# Patient Record
Sex: MALE | Race: WHITE | NOT HISPANIC OR LATINO | Employment: OTHER | URBAN - NONMETROPOLITAN AREA
[De-identification: names, ages, dates, MRNs, and addresses within clinical notes are randomized per-mention and may not be internally consistent; named-entity substitution may affect disease eponyms.]

---

## 2017-01-03 RX ORDER — PRAVASTATIN SODIUM 40 MG
TABLET ORAL
Qty: 30 TABLET | Refills: 1 | Status: SHIPPED | OUTPATIENT
Start: 2017-01-03 | End: 2018-05-01 | Stop reason: ALTCHOICE

## 2017-01-03 RX ORDER — GLYBURIDE 2.5 MG/1
TABLET ORAL
Qty: 60 TABLET | Refills: 1 | Status: SHIPPED | OUTPATIENT
Start: 2017-01-03 | End: 2017-04-12 | Stop reason: SDUPTHER

## 2017-04-03 PROBLEM — E78.5 HYPERLIPIDEMIA: Status: ACTIVE | Noted: 2017-04-03

## 2017-04-03 PROBLEM — I10 ESSENTIAL HYPERTENSION: Status: ACTIVE | Noted: 2017-04-03

## 2017-04-05 ENCOUNTER — TELEPHONE (OUTPATIENT)
Dept: FAMILY MEDICINE CLINIC | Facility: CLINIC | Age: 37
End: 2017-04-05

## 2017-04-05 NOTE — TELEPHONE ENCOUNTER
Patient Spouse rescheduled appt.  Requested refills but Patient has refills on all prescribed medications at [facility], Patient Spouse notified and she verbalized understanding.

## 2017-04-05 NOTE — TELEPHONE ENCOUNTER
----- Message from Alaina Zhang sent at 4/5/2017  1:01 PM CDT -----  Regarding: med refill  Patient needs refill on all meds, valdo [facility], [phone]

## 2017-04-12 ENCOUNTER — OFFICE VISIT (OUTPATIENT)
Dept: FAMILY MEDICINE CLINIC | Facility: CLINIC | Age: 37
End: 2017-04-12

## 2017-04-12 VITALS
WEIGHT: 228 LBS | OXYGEN SATURATION: 98 % | TEMPERATURE: 97.2 F | SYSTOLIC BLOOD PRESSURE: 132 MMHG | BODY MASS INDEX: 30.22 KG/M2 | DIASTOLIC BLOOD PRESSURE: 84 MMHG | HEIGHT: 73 IN | HEART RATE: 94 BPM

## 2017-04-12 DIAGNOSIS — J01.00 ACUTE NON-RECURRENT MAXILLARY SINUSITIS: Primary | ICD-10-CM

## 2017-04-12 PROCEDURE — 99212 OFFICE O/P EST SF 10 MIN: CPT | Performed by: NURSE PRACTITIONER

## 2017-04-12 RX ORDER — METHADONE HYDROCHLORIDE 40 MG/1
120 TABLET ORAL DAILY
COMMUNITY
End: 2018-05-01 | Stop reason: ALTCHOICE

## 2017-04-12 RX ORDER — PRAVASTATIN SODIUM 40 MG
TABLET ORAL
Qty: 30 TABLET | Refills: 1 | Status: SHIPPED | OUTPATIENT
Start: 2017-04-12 | End: 2017-04-12 | Stop reason: SDUPTHER

## 2017-04-12 RX ORDER — GABAPENTIN 300 MG/1
CAPSULE ORAL
Qty: 60 CAPSULE | Refills: 2 | Status: SHIPPED | OUTPATIENT
Start: 2017-04-12 | End: 2017-07-31 | Stop reason: SDUPTHER

## 2017-04-12 NOTE — PROGRESS NOTES
Subjective   Pancho Hobson is a 36 y.o. male who presents to the office complaining of sinus congestion and facial pain.  Has been taking Sine-Off the past four days.    History of Present Illness     The following portions of the patient's history were reviewed and updated as appropriate: allergies, current medications, past family history, past medical history, past social history, past surgical history and problem list.    Review of Systems   Constitutional: Negative for chills, fatigue and fever.   HENT: Positive for sinus pressure. Negative for congestion, sneezing, sore throat and trouble swallowing.    Eyes: Negative for visual disturbance.   Respiratory: Negative for cough, chest tightness, shortness of breath and wheezing.    Cardiovascular: Negative for chest pain, palpitations and leg swelling.   Gastrointestinal: Negative for abdominal pain, constipation, diarrhea, nausea and vomiting.   Genitourinary: Negative for dysuria, frequency and urgency.   Musculoskeletal: Negative for neck pain.   Skin: Negative for rash.   Neurological: Negative for dizziness, weakness and headaches.   Psychiatric/Behavioral:        In the past two weeks the pt has not felt down, depressed, hopeless or lost interest in doing things   All other systems reviewed and are negative.    Objective   Physical Exam   Constitutional: He is oriented to person, place, and time. He appears well-developed and well-nourished. He is cooperative.   HENT:   Head: Normocephalic and atraumatic.   Right Ear: External ear normal.   Left Ear: External ear normal.   Nose: Right sinus exhibits maxillary sinus tenderness. Left sinus exhibits maxillary sinus tenderness.   Mouth/Throat: Uvula is midline, oropharynx is clear and moist and mucous membranes are normal.   Eyes: Conjunctivae, EOM and lids are normal. Pupils are equal, round, and reactive to light. Right eye exhibits no discharge. Left eye exhibits no discharge. No scleral icterus.   Neck:  Trachea normal, normal range of motion and phonation normal. Neck supple. No thyromegaly present.   Cardiovascular: Normal rate, regular rhythm, normal heart sounds and intact distal pulses.  Exam reveals no gallop and no friction rub.    No murmur heard.  Pulmonary/Chest: Effort normal and breath sounds normal. No respiratory distress. He has no wheezes. He has no rales.   Abdominal: Soft. Normal appearance and bowel sounds are normal. He exhibits no distension. There is no tenderness. There is no rebound and no guarding.   Musculoskeletal: Normal range of motion. He exhibits no edema.   Lymphadenopathy:     He has no cervical adenopathy.   Neurological: He is alert and oriented to person, place, and time. No cranial nerve deficit. GCS eye subscore is 4. GCS verbal subscore is 5. GCS motor subscore is 6.   Skin: Skin is warm, dry and intact. No rash noted.   Psychiatric: He has a normal mood and affect. His speech is normal and behavior is normal. Judgment and thought content normal. Cognition and memory are normal.   Nursing note and vitals reviewed.    Assessment/Plan   Pancho was seen today for sinusitis.    Diagnoses and all orders for this visit:    Acute non-recurrent maxillary sinusitis

## 2017-04-19 PROBLEM — J01.00 ACUTE NON-RECURRENT MAXILLARY SINUSITIS: Status: ACTIVE | Noted: 2017-04-19

## 2017-06-12 RX ORDER — GLYBURIDE 2.5 MG/1
TABLET ORAL
Qty: 60 TABLET | Refills: 1 | Status: SHIPPED | OUTPATIENT
Start: 2017-06-12 | End: 2017-07-31 | Stop reason: SDUPTHER

## 2017-07-17 RX ORDER — PRAVASTATIN SODIUM 40 MG
TABLET ORAL
Qty: 30 TABLET | Refills: 1 | Status: SHIPPED | OUTPATIENT
Start: 2017-07-17 | End: 2018-05-01 | Stop reason: ALTCHOICE

## 2017-07-17 RX ORDER — GABAPENTIN 300 MG/1
CAPSULE ORAL
Qty: 60 CAPSULE | Refills: 2 | OUTPATIENT
Start: 2017-07-17

## 2017-07-31 RX ORDER — GLYBURIDE 2.5 MG/1
TABLET ORAL
Qty: 60 TABLET | Refills: 1 | Status: SHIPPED | OUTPATIENT
Start: 2017-07-31 | End: 2018-05-01 | Stop reason: ALTCHOICE

## 2017-07-31 RX ORDER — GABAPENTIN 300 MG/1
CAPSULE ORAL
Qty: 60 CAPSULE | Refills: 2 | Status: SHIPPED | OUTPATIENT
Start: 2017-07-31 | End: 2017-08-04 | Stop reason: SDUPTHER

## 2017-08-04 DIAGNOSIS — Z79.899 LONG TERM USE OF DRUG: Primary | ICD-10-CM

## 2017-08-04 RX ORDER — GLYBURIDE 2.5 MG/1
2.5 TABLET ORAL 2 TIMES DAILY WITH MEALS
Qty: 60 TABLET | Refills: 1 | Status: SHIPPED | OUTPATIENT
Start: 2017-08-04 | End: 2018-05-01 | Stop reason: ALTCHOICE

## 2017-08-04 RX ORDER — GABAPENTIN 300 MG/1
300 CAPSULE ORAL 2 TIMES DAILY
Qty: 60 CAPSULE | Refills: 0 | Status: SHIPPED | OUTPATIENT
Start: 2017-08-04 | End: 2018-05-01 | Stop reason: ALTCHOICE

## 2018-01-11 ENCOUNTER — HOSPITAL ENCOUNTER (EMERGENCY)
Facility: HOSPITAL | Age: 38
Discharge: HOME | End: 2018-01-11
Attending: EMERGENCY MEDICINE | Admitting: EMERGENCY MEDICINE

## 2018-01-11 VITALS
HEIGHT: 73 IN | HEART RATE: 115 BPM | WEIGHT: 200 LBS | TEMPERATURE: 98.3 F | DIASTOLIC BLOOD PRESSURE: 93 MMHG | RESPIRATION RATE: 22 BRPM | OXYGEN SATURATION: 98 % | BODY MASS INDEX: 26.51 KG/M2 | SYSTOLIC BLOOD PRESSURE: 139 MMHG

## 2018-01-11 DIAGNOSIS — M54.6 ACUTE MIDLINE THORACIC BACK PAIN: ICD-10-CM

## 2018-01-11 DIAGNOSIS — S29.019A ACUTE THORACIC MYOFASCIAL STRAIN, INITIAL ENCOUNTER: Primary | ICD-10-CM

## 2018-01-11 PROCEDURE — 96372 THER/PROPH/DIAG INJ SC/IM: CPT

## 2018-01-11 PROCEDURE — 25010000002 TRIAMCINOLONE PER 10 MG: Performed by: EMERGENCY MEDICINE

## 2018-01-11 PROCEDURE — 25010000002 PROMETHAZINE PER 50 MG: Performed by: EMERGENCY MEDICINE

## 2018-01-11 PROCEDURE — 99282 EMERGENCY DEPT VISIT SF MDM: CPT

## 2018-01-11 RX ORDER — PROMETHAZINE HYDROCHLORIDE 25 MG/ML
25 INJECTION, SOLUTION INTRAMUSCULAR; INTRAVENOUS ONCE
Status: COMPLETED | OUTPATIENT
Start: 2018-01-11 | End: 2018-01-11

## 2018-01-11 RX ORDER — IBUPROFEN 800 MG/1
800 TABLET ORAL
Qty: 21 TABLET | Refills: 0 | Status: SHIPPED | OUTPATIENT
Start: 2018-01-11 | End: 2018-05-01 | Stop reason: ALTCHOICE

## 2018-01-11 RX ORDER — CYCLOBENZAPRINE HCL 10 MG
10 TABLET ORAL 3 TIMES DAILY PRN
Qty: 15 TABLET | Refills: 0 | Status: SHIPPED | OUTPATIENT
Start: 2018-01-11 | End: 2018-05-01

## 2018-01-11 RX ORDER — HYDROCODONE BITARTRATE AND ACETAMINOPHEN 5; 325 MG/1; MG/1
1 TABLET ORAL EVERY 6 HOURS PRN
Qty: 15 TABLET | Refills: 0 | Status: SHIPPED | OUTPATIENT
Start: 2018-01-11 | End: 2018-05-01 | Stop reason: ALTCHOICE

## 2018-01-11 RX ORDER — TRIAMCINOLONE ACETONIDE 40 MG/ML
40 INJECTION, SUSPENSION INTRA-ARTICULAR; INTRAMUSCULAR ONCE
Status: COMPLETED | OUTPATIENT
Start: 2018-01-11 | End: 2018-01-11

## 2018-01-11 RX ADMIN — TRIAMCINOLONE ACETONIDE 40 MG: 40 INJECTION, SUSPENSION INTRA-ARTICULAR; INTRAMUSCULAR at 16:38

## 2018-01-11 RX ADMIN — HYDROMORPHONE HYDROCHLORIDE 2 MG: 10 INJECTION, SOLUTION INTRAMUSCULAR; INTRAVENOUS; SUBCUTANEOUS at 16:36

## 2018-01-11 RX ADMIN — PROMETHAZINE HYDROCHLORIDE 25 MG: 25 INJECTION INTRAMUSCULAR; INTRAVENOUS at 16:38

## 2018-01-11 NOTE — DISCHARGE INSTRUCTIONS
Continue methadone, begin cyclobenzaprine and and anti-inflammatories.  Minimize strenuous activity.  Follow-up with your doctor in 3 days symptoms unimproved.

## 2018-01-11 NOTE — ED PROVIDER NOTES
Subjective   HPI Comments: Patient presents with mid to low T-spine pain.  Patient notes that 3 days ago his child was jumping on his back.  Patient began having pain at that time.  Patient has no associated symptoms.  No neurologic symptoms or present.  Patient's had no bowel or bladder changes.  Denies any fevers chills chest pain shortness of breath.  Patient does have a history chronic pain for which she is on methadone.      History provided by:  Patient   used: No        Review of Systems   Constitutional: Negative.  Negative for appetite change, chills and fever.   HENT: Negative.  Negative for congestion.    Eyes: Negative.  Negative for photophobia and visual disturbance.   Respiratory: Negative.  Negative for cough, chest tightness and shortness of breath.    Cardiovascular: Negative.  Negative for chest pain and palpitations.   Gastrointestinal: Negative.  Negative for abdominal pain, constipation, diarrhea, nausea and vomiting.   Endocrine: Negative.    Genitourinary: Negative.  Negative for decreased urine volume, dysuria, flank pain and hematuria.   Musculoskeletal: Positive for back pain. Negative for arthralgias, myalgias, neck pain and neck stiffness.   Skin: Negative.  Negative for pallor.   Neurological: Negative.  Negative for dizziness, syncope, weakness, light-headedness, numbness and headaches.   Psychiatric/Behavioral: Negative.  Negative for confusion and suicidal ideas. The patient is not nervous/anxious.        Past Medical History:   Diagnosis Date   • Closed fracture dislocation of wrist     Closed fracture dislocation of wrist - right wrist, has pins      • Closed fracture of foot     Closed fracture of foot - bilat.    • Closed fracture of right hip    • Compression fracture of spine     Compression fracture of lumbar spine          Allergies   Allergen Reactions   • Codeine    • Penicillins        Past Surgical History:   Procedure Laterality Date   • HIP FRACTURE  SURGERY      Treat hip fracture-right hip with pins   • TONSILLECTOMY     • TRACHEOSTOMY  10/2009    Oct 2009   • WRIST FRACTURE SURGERY      Treat wrist fracture-right wrist with screws       Family History   Problem Relation Age of Onset   • Diabetes Mother    • Heart disease Mother    • Hypertension Mother    • Heart disease Father    • Coronary artery disease Other    • Depression Other    • Other Other      Smoking tobacco 3-4 cigs/day       Social History     Social History   • Marital status:      Spouse name: N/A   • Number of children: N/A   • Years of education: N/A     Social History Main Topics   • Smoking status: Current Every Day Smoker     Packs/day: 0.50     Types: Cigarettes   • Smokeless tobacco: Never Used      Comment: 10-19 cigs/day   • Alcohol use No   • Drug use: Yes      Comment: Past   • Sexual activity: Not on file     Other Topics Concern   • Not on file     Social History Narrative           Objective   Physical Exam   Constitutional: He is oriented to person, place, and time. He appears well-developed and well-nourished. He appears distressed.   HENT:   Head: Normocephalic and atraumatic.   Eyes: Conjunctivae and EOM are normal.   Neck: Normal range of motion. Neck supple. No JVD present.   Cardiovascular: Normal rate, regular rhythm, normal heart sounds and intact distal pulses.  Exam reveals no gallop and no friction rub.    No murmur heard.  Pulmonary/Chest: Effort normal. No respiratory distress. He has no wheezes. He has no rales. He exhibits no tenderness.   Abdominal: Soft. Bowel sounds are normal. He exhibits no distension and no mass. There is no tenderness. There is no rebound and no guarding.   Musculoskeletal: Normal range of motion.   Pain at the T 10 to T12 region of the back.  There is no crepitus or step-off.  There is no scoliosis noted.  There are no skin changes noted.  Normal symmetric reflexes distally.  Negative straight leg raise.  Patient is ambulatory.    Neurological: He is alert and oriented to person, place, and time.   Skin: Skin is warm and dry.   Psychiatric: He has a normal mood and affect. His behavior is normal. Judgment and thought content normal.   Nursing note and vitals reviewed.      Procedures         ED Course  ED Course      Mid back strain symptomatic care.  Cauda equina.            MDM    Final diagnoses:   Acute thoracic myofascial strain, initial encounter   Acute midline thoracic back pain            Toan Peralta MD  01/11/18 [id number]

## 2018-04-20 RX ORDER — GLYBURIDE 2.5 MG/1
2.5 TABLET ORAL 2 TIMES DAILY WITH MEALS
Qty: 60 TABLET | Refills: 1 | OUTPATIENT
Start: 2018-04-20

## 2018-05-01 ENCOUNTER — OFFICE VISIT (OUTPATIENT)
Dept: GASTROENTEROLOGY | Facility: CLINIC | Age: 38
End: 2018-05-01

## 2018-05-01 VITALS
WEIGHT: 194 LBS | DIASTOLIC BLOOD PRESSURE: 80 MMHG | SYSTOLIC BLOOD PRESSURE: 118 MMHG | BODY MASS INDEX: 25.71 KG/M2 | HEIGHT: 73 IN | HEART RATE: 91 BPM

## 2018-05-01 DIAGNOSIS — E78.00 HYPERCHOLESTEROLEMIA: ICD-10-CM

## 2018-05-01 DIAGNOSIS — B18.2 CHRONIC HEPATITIS C WITHOUT HEPATIC COMA (HCC): Primary | ICD-10-CM

## 2018-05-01 DIAGNOSIS — R74.8 ELEVATED LIVER ENZYMES: ICD-10-CM

## 2018-05-01 PROCEDURE — 99213 OFFICE O/P EST LOW 20 MIN: CPT | Performed by: PHYSICIAN ASSISTANT

## 2018-05-01 RX ORDER — INSULIN GLARGINE 100 [IU]/ML
25 INJECTION, SOLUTION SUBCUTANEOUS DAILY
COMMUNITY
Start: 2018-04-23 | End: 2021-09-21 | Stop reason: SDUPTHER

## 2018-05-01 RX ORDER — GLIMEPIRIDE 2 MG/1
2 TABLET ORAL
Refills: 1 | COMMUNITY
Start: 2018-01-31 | End: 2020-12-17

## 2018-05-01 RX ORDER — INSULIN ASPART 100 [IU]/ML
INJECTION, SOLUTION INTRAVENOUS; SUBCUTANEOUS
COMMUNITY
Start: 2018-04-23 | End: 2021-09-23 | Stop reason: ALTCHOICE

## 2018-05-01 NOTE — PROGRESS NOTES
Chief Complaint   Patient presents with   • Hepatitis C     Ref. Estrellita Ley DO       ENDO PROCEDURE ORDERED:    Subjective    Pancho Hobson is a 37 y.o. male. he is being seen for consultation today at the request of Dr. Ley.    History of Present Illness    This 37-year-old disabled male was sent for consultation for hepatitis C by Dr. Ley, who saw the patient on 02/05/2018, with diabetes, elevated liver enzymes, high cholesterol, hepatitis C antibody positive. Patient was late to this appointment, he denies abdominal pain, heartburn, nausea, vomiting, dysphagia. Bowel movements are regular without blood or mucus. Weight has been stable. Appetite normal. He has never had endotoscopy.     Patient had laboratories on 01/31/2018, GGT was 702. Hepatitis diagnostic panel showed positive hepatitis C antibody. CBC showed 472 platelets. CMP showed glucose 462, sodium 128, alkaline phosphatase 257, AST 73, , total bilirubin 2.0, otherwise normal. Triglycerides 1295, cholesterol 332. A1c 10.3%.     Followup laboratories on 02/05/2018 showed an HCV RNA by PCR quantitative 10,900,000 international units per mL, equivalent to 7.04 log international units. HIV was negative. Genotype 1A.     Patient is a half pack per day smoker for the past 20 years, strongly encouraged to quit.  He denied alcohol, significant history of alcohol use. He states he has been going to the methadone clinic, he claims his last use was 2 years ago. He states he did use IV and intranasal drugs, 1st use was more than a year ago and the last use was approximately 1 month ago, he states he was using heroin.  He has approximately 6 tattoos, the first done 20 years ago and the last done 6 years ago, he thinks they were all professional. No history of blood donation or transfusion, he is not certain about his spouse. He has a history of diabetes, pneumonia, injuries to his feet, wrist, back. Family history of lymphoma, lung cancer,  prostate cancer, diabetes, heart disease, stroke, hypertension, kidney disease, nervous problems, no known family history of GI tract malignancy or liver disease. Father age 61 with heart disease, mother age 60 with diabetes, spouse age 37 in good health,  since , he lists no siblings, 3 children in good health, 1 child  at birth.    ASSESSMENT/PLAN: Presumed chronic active hepatitis C, this could be acute, his enzymes are significantly elevated, I did not have any other laboratories to compare with. Certainly he has poorly controlled diabetes, significant elevation in triglycerides. I told him on no uncertain terms that he must get his diabetes and cholesterol under control, he is at very high risk for pancreatitis, as well as liver malignancy. He is encouraged to maintain sobriety. I did recommend aggressive treatment of his diabetes and high cholesterol, he was encouraged to follow up with Dr. Ley. I did recommend repeat testing to include HCV RNA by PCR quantitative, HCV genotype, HCV FibroSure, VANEGAS FibroSure as I suspect he does have a significant degree of fatty liver. Recommend AFP as well as right upper quadrant ultrasound testing for hepatoma and given his significant elevation in liver enzymes. Will test for comorbid liver disease to include PHILIP, AMA, SMA, ceruloplasmin, iron studies, alpha 1 antitrypsin, INR. He should be immunized for hepatitis A and B and will need to return to Dr. Ley for that. Briefly discussed various treatment options for the patient, the importance of followup, the need for further evaluation depending on the results of the above. Will see him following the above, further pending clinical course and the results of the above.     Thank you very much, Dr. Ley, for this consultation, and for allowing us to participate in the care of your patient. Will keep you informed.        The following portions of the patient's history were reviewed and updated as  appropriate:   Past Medical History:   Diagnosis Date   • Closed fracture dislocation of wrist     Closed fracture dislocation of wrist - right wrist, has pins      • Closed fracture of foot     Closed fracture of foot - bilat.    • Closed fracture of right hip    • Compression fracture of spine     Compression fracture of lumbar spine      • Infectious viral hepatitis      Past Surgical History:   Procedure Laterality Date   • HIP FRACTURE SURGERY      Treat hip fracture-right hip with pins   • TONSILLECTOMY     • TRACHEOSTOMY  10/2009    Oct 2009   • WRIST FRACTURE SURGERY      Treat wrist fracture-right wrist with screws     Family History   Problem Relation Age of Onset   • Diabetes Mother    • Heart disease Mother    • Hypertension Mother    • Heart disease Father    • Coronary artery disease Other    • Depression Other    • Other Other      Smoking tobacco 3-4 cigs/day       Allergies   Allergen Reactions   • Codeine GI Intolerance     Social History     Social History   • Marital status:      Social History Main Topics   • Smoking status: Current Every Day Smoker     Packs/day: 0.25     Types: Cigarettes   • Smokeless tobacco: Never Used      Comment: 10-19 cigs/day   • Alcohol use No   • Drug use:      Types: Marijuana      Comment: Past     Other Topics Concern   • Not on file       Current Outpatient Prescriptions:   •  glimepiride (AMARYL) 2 MG tablet, Take 2 mg by mouth Every Morning Before Breakfast., Disp: , Rfl: 1  •  glucose blood (FREESTYLE TEST STRIPS) test strip, Use as instructed, Disp: 90 each, Rfl: 2  •  Lancets (FREESTYLE) lancets, Use as instructed, Disp: 90 each, Rfl: 2  •  LANTUS SOLOSTAR 100 UNIT/ML injection pen, , Disp: , Rfl:   •  metFORMIN (GLUCOPHAGE) 1000 MG tablet, Take one-half tablet by mouth twice daily., Disp: 60 tablet, Rfl: 1  •  NOVOLOG FLEXPEN 100 UNIT/ML solution pen-injector sc pen, , Disp: , Rfl:   Review of Systems  Review of Systems   Constitutional: Negative for  "unexpected weight change.   HENT: Negative for trouble swallowing.    Gastrointestinal: Positive for abdominal pain. Negative for abdominal distention, anal bleeding, blood in stool, constipation, diarrhea, nausea, rectal pain and vomiting.   Genitourinary: Negative for difficulty urinating.   Musculoskeletal: Negative for arthralgias.   All other systems reviewed and are negative.         Objective    /80 (BP Location: Right arm)   Pulse 91   Ht 185.4 cm (73\")   Wt 88 kg (194 lb)   BMI 25.60 kg/m²   Physical Exam   Constitutional: He is oriented to person, place, and time. He appears well-developed and well-nourished. No distress.   unkempt   HENT:   Head: Normocephalic and atraumatic.   Eyes: EOM are normal. Pupils are equal, round, and reactive to light.   Neck: Normal range of motion.   Cardiovascular: Normal rate, regular rhythm and normal heart sounds.    Pulmonary/Chest: Effort normal and breath sounds normal.   Abdominal: Soft. Bowel sounds are normal. He exhibits no shifting dullness, no distension, no abdominal bruit, no ascites and no mass. There is no hepatosplenomegaly. There is tenderness. There is no rigidity, no rebound, no guarding and no CVA tenderness. No hernia. Hernia confirmed negative in the ventral area.   Musculoskeletal: Normal range of motion.   Neurological: He is alert and oriented to person, place, and time.   Skin: Skin is warm and dry.   Psychiatric: He has a normal mood and affect. His behavior is normal. Judgment and thought content normal.   Tearful at times   Nursing note and vitals reviewed.    Assessment/Plan      1. Chronic hepatitis C without hepatic coma    2. Elevated liver enzymes    3. Hypercholesterolemia    .   Pancho was seen today for hepatitis c.    Diagnoses and all orders for this visit:    Chronic hepatitis C without hepatic coma  Comments:  1a  Orders:  -     CBC Auto Differential  -     Comprehensive Metabolic Panel  -     TSH  -     Iron and TIBC  -   "   Ferritin  -     AFP Tumor Marker  -     Alpha - 1 - Antitrypsin  -     Anti-Smooth Muscle Antibody Titer  -     Ceruloplasmin  -     HCV RT-PCR,Quant(Non-Graph)  -     HCV FibroSURE  -     Protime-INR  -     Nuclear Antigen Antibody, IFA  -     VANEGAS Fibrosure  -     Mitochondrial Antibodies, M2  -     Hepatitis C Genotype  -     US Abdomen Limited    Elevated liver enzymes  -     CBC Auto Differential  -     Comprehensive Metabolic Panel  -     TSH  -     Iron and TIBC  -     Ferritin  -     AFP Tumor Marker  -     Alpha - 1 - Antitrypsin  -     Anti-Smooth Muscle Antibody Titer  -     Ceruloplasmin  -     HCV RT-PCR,Quant(Non-Graph)  -     HCV FibroSURE  -     Protime-INR  -     Nuclear Antigen Antibody, IFA  -     VANEGAS Fibrosure  -     Mitochondrial Antibodies, M2  -     Hepatitis C Genotype  -     US Abdomen Limited    Hypercholesterolemia  -     CBC Auto Differential  -     Comprehensive Metabolic Panel  -     TSH  -     Iron and TIBC  -     Ferritin  -     AFP Tumor Marker  -     Alpha - 1 - Antitrypsin  -     Anti-Smooth Muscle Antibody Titer  -     Ceruloplasmin  -     HCV RT-PCR,Quant(Non-Graph)  -     HCV FibroSURE  -     Protime-INR  -     Nuclear Antigen Antibody, IFA  -     VANEGAS Fibrosure  -     Mitochondrial Antibodies, M2  -     Hepatitis C Genotype  -     US Abdomen Limited        Orders placed during this encounter include:  Orders Placed This Encounter   Procedures   • US Abdomen Limited     Scheduling Instructions:      RUQ     Order Specific Question:   Reason for Exam:     Answer:   HCV, elevated LFT   • CBC Auto Differential   • Comprehensive Metabolic Panel   • TSH   • Iron and TIBC   • Ferritin   • AFP Tumor Marker   • Alpha - 1 - Antitrypsin   • Anti-Smooth Muscle Antibody Titer   • Ceruloplasmin   • HCV FibroSURE   • Protime-INR   • Nuclear Antigen Antibody, IFA   • VANEGAS Fibrosure   • Mitochondrial Antibodies, M2   • Hepatitis C Genotype       Medications prescribed:  No orders of the  defined types were placed in this encounter.    Discontinued Medications       Reason for Discontinue    cyclobenzaprine (FLEXERIL) 10 MG tablet *Therapy completed    gabapentin (NEURONTIN) 300 MG capsule Discontinued by another clinician    glyBURIDE (DIAbeta) 2.5 MG tablet Discontinued by another clinician    glyBURIDE (DIAbeta) 2.5 MG tablet Discontinued by another clinician    HYDROcodone-acetaminophen (NORCO) 5-325 MG per tablet Discontinued by another clinician    ibuprofen (ADVIL,MOTRIN) 800 MG tablet Discontinued by another clinician    pravastatin (PRAVACHOL) 40 MG tablet Discontinued by another clinician    methadone (METHADOSE) 40 MG disintegrating tablet Discontinued by another clinician    pravastatin (PRAVACHOL) 40 MG tablet Discontinued by another clinician        Requested Prescriptions      No prescriptions requested or ordered in this encounter       Review and/or summary of lab tests, radiology, procedures, medications. Review and summary of old records and obtaining of history. The risks and benefits of my recommendations, as well as other treatment options were discussed with the patient today. Questions were answered.    Follow-up: Return if symptoms worsen or fail to improve, for After the above.     * Surgery not found *      This document has been electronically signed by Luis May PA-C on May 4, 2018 3:27 PM      Results for orders placed or performed during the hospital encounter of 10/13/16   TSH   Result Value Ref Range    TSH 0.52 0.46 - 4.68 uIU/ml   T4, Free   Result Value Ref Range    Free T4 1.41 0.78 - 2.19 ng/dl   Hemoglobin A1c   Result Value Ref Range    Hemoglobin A1C 11.3 (H) 4.0 - 5.6 %TotHgb   Results for orders placed or performed during the hospital encounter of 10/13/16   CBC & Differential   Result Value Ref Range    WBC 10.1 (H) 3.2 - 9.8 x1000/uL    RBC 5.29 4.37 - 5.74 alma delia/mm3    Hemoglobin 15.2 13.7 - 17.3 gm/dl    Hematocrit 43.8 39.0 - 49.0 %    MCV 82.8  80.0 - 98.0 fl    MCH 28.7 26.0 - 34.0 pg    MCHC 34.7 31.5 - 36.3 gm/dl    Platelets 360 150 - 450 x1000/mm3    RDW 12.9 11.5 - 14.5 %    MPV 10.2 8.0 - 12.0 fl    Neutrophil Rel % 61.4 37.0 - 80.0 %    Lymphocyte Rel % 28.6 10.0 - 50.0 %    Monocyte Rel % 7.6 0.0 - 12.0 %    Eosinophil Rel % 2.1 0.0 - 7.0 %    Basophil Rel % 0.3 0.0 - 2.0 %    nRBC 0     nRBC 0    Lipid Panel   Result Value Ref Range    Total Cholesterol 358 (H) 150 - 200 mg/dl    Triglycerides 1,677 (H) 35 - 160 mg/dl    HDL Cholesterol 26.2 (L) 35.0 - 100.0 mg/dl    LDL Cholesterol  332 mg/dl   Comprehensive Metabolic Panel   Result Value Ref Range    Glucose 308 (H) 70.0 - 100.0 mg/dl    BUN 11 8.0 - 25.0 mg/dl    Creatinine 0.6 0.4 - 1.3 mg/dl    Sodium 134.0 134 - 146 mmol/L    Potassium 4.8 3.4 - 5.4 mmol/L    Chloride 99.0 (L) 100.0 - 112.0 mmol/L    CO2 29.0 20.0 - 32.0 mmol/L    Calcium 9.2 8.4 - 10.8 mg/dl    Total Protein 7.5 6.7 - 8.2 gm/dl    Albumin 4.1 3.2 - 5.5 gm/dl    Total Bilirubin 0.7 0.2 - 1.0 mg/dl    Alkaline Phosphatase 129 (H) 15 - 121 U/L    ALT (SGPT) 26 10 - 60 U/L    AST (SGOT) 19 10 - 60 U/L    GFR MDRD Non  152 70 - 162 mL/min/1.73 sq.M    GFR MDRD  184 (H) 70 - 162 mL/min/1.73 sq.M    Anion Gap 6.0 5.0 - 15.0 mmol/L       Some portions of this note have been dictated using voice recognition software and may contain errors and/or omissions.

## 2018-05-01 NOTE — PATIENT INSTRUCTIONS
Hepatitis C  Hepatitis C is a viral infection of the liver. It can lead to scarring of the liver (cirrhosis), liver failure, or liver cancer. Hepatitis C may go undetected for months or years because people with the infection may not have symptoms, or they may have only mild symptoms.  What are the causes?  Hepatitis C is caused by the hepatitis C virus (HCV). The virus can be passed from one person to another through:  · Blood.  · Contaminated needles, such as those used for tattooing, body piercing, acupuncture, or injecting drugs.  · Having unprotected sex with an infected person.  · Childbirth.  · Blood transfusions or organ transplants done in the [place] before 1992.  What increases the risk?  Risk factors for hepatitis C include:  · Having unprotected sex with an infected person.  · Using illegal drugs.  What are the signs or symptoms?  Symptoms of hepatitis C may include:  · Fatigue.  · Loss of appetite.  · Nausea.  · Vomiting.  · Abdominal pain.  · Dark yellow urine.  · Yellowish skin and eyes (jaundice).  · Itching of the skin.  · Kike-colored bowel movements.  · Joint pain.  Symptoms are not always present.  How is this diagnosed?  Hepatitis C is diagnosed with blood tests. Other types of tests may also be done to check how your liver is functioning.  How is this treated?  Your health care provider may perform noninvasive tests or a liver biopsy to help determine the best course of treatment. Treatment for hepatitis C may include one or more medicines. Your health care provider may check you for a recurring infection or other liver conditions every 6-12 months after treatment.  Follow these instructions at home:  · Rest as needed.  · Take all medicines as directed by your health care provider.  · Do not take any medicine unless approved by your health care provider. This includes over-the-counter medicine and birth control pills.  · Do not drink alcohol.  · Do not have sex until approved by your  health care provider.  · Do not share toothbrushes, nail clippers, razors, or needles.  How is this prevented?  There is no vaccine for hepatitis C. The only way to prevent the disease is to reduce the risk of exposure to the virus. This may be done by:  · Practicing safe sex and using condoms.  · Avoiding illegal drugs.  Contact a health care provider if:  · You have a fever.  · You develop abdominal pain.  · You develop dark urine.  · You have uyen-colored bowel movements.  · You develop joint pains.  Get help right away if:  · You have increasing fatigue or weakness.  · You lose your appetite.  · You feel nauseous or vomit.  · You develop jaundice or your jaundice gets worse.  · You bruise or bleed easily.  This information is not intended to replace advice given to you by your health care provider. Make sure you discuss any questions you have with your health care provider.  Document Released: 12/15/2001 Document Revised: 05/25/2017 Document Reviewed: 04/01/2015  [employer] Interactive Patient Education © 2017 [employer]  MyPlate from [employer]  The general, healthful diet is based on the 2010 Dietary Guidelines for Americans. The amount of food you need to eat from each food group depends on your age, sex, and level of physical activity and can be individualized by a dietitian. Go to ChooseMyPlate.gov for more information.  What do I need to know about the MyPlate plan?  · Enjoy your food, but eat less.  · Avoid oversized portions.  ¨ ½ of your plate should include fruits and vegetables.  ¨ ¼ of your plate should be grains.  ¨ ¼ of your plate should be protein.  Grains   · Make at least half of your grains whole grains.  · For a 2,000 calorie daily food plan, eat 6 oz every day.  · 1 oz is about 1 slice bread, 1 cup cereal, or ½ cup cooked rice, cereal, or pasta.  Vegetables   · Make half your plate fruits and vegetables.  · For a 2,000 calorie daily food plan, eat 2½ cups every day.  · 1 cup is about 1 cup raw or  cooked vegetables or vegetable juice or 2 cups raw leafy greens.  Fruits   · Make half your plate fruits and vegetables.  · For a 2,000 calorie daily food plan, eat 2 cups every day.  · 1 cup is about 1 cup fruit or 100% fruit juice or ½ cup dried fruit.  Protein   · For a 2,000 calorie daily food plan, eat 5½ oz every day.  · 1 oz is about 1 oz meat, poultry, or fish, ¼ cup cooked beans, 1 egg, 1 Tbsp peanut butter, or ½ oz nuts or seeds.  Dairy   · Switch to fat-free or low-fat (1%) milk.  · For a 2,000 calorie daily food plan, eat 3 cups every day.  · 1 cup is about 1 cup milk or yogurt or soy milk (soy beverage), 1½ oz natural cheese, or 2 oz processed cheese.  Fats, Oils, and Empty Calories   · Only small amounts of oils are recommended.  · Empty calories are calories from solid fats or added sugars.  · Compare sodium in foods like soup, bread, and frozen meals. Choose the foods with lower numbers.  · Drink water instead of sugary drinks.  What foods can I eat?  Grains   Whole grains such as whole wheat, quinoa, millet, and bulgur. Bread, rolls, and pasta made from whole grains. Brown or wild rice. Hot or cold cereals made from whole grains and without added sugar.  Vegetables   All fresh vegetables, especially fresh red, dark green, or orange vegetables. Peas and beans. Low-sodium frozen or canned vegetables prepared without added salt. Low-sodium vegetable juices.  Fruits   All fresh, frozen, and dried fruits. Canned fruit packed in water or fruit juice without added sugar. Fruit juices without added sugar.  Meats and Other Protein Sources   Boiled, baked, or grilled lean meat trimmed of fat. Skinless poultry. Fresh seafood and shellfish. Canned seafood packed in water. Unsalted nuts and unsalted nut butters. Tofu. Dried beans and pea. Eggs.  Dairy   Low-fat or fat-free milk, yogurt, and cheeses.  Sweets and Desserts   Frozen desserts made from low-fat milk.  Fats and Oils   Olive, peanut, and canola oils and  margarine. Salad dressing and mayonnaise made from these oils.  Other   Soups and casseroles made from allowed ingredients and without added fat or salt.  The items listed above may not be a complete list of recommended foods or beverages. Contact your dietitian for more options.   What foods are not recommended?  Grains   Sweetened, low-fiber cereals. Packaged baked goods. Snack crackers and chips. Cheese crackers, butter crackers, and biscuits. Frozen waffles, sweet breads, doughnuts, pastries, packaged baking mixes, pancakes, cakes, and cookies.  Vegetables   Regular canned or frozen vegetables or vegetables prepared with salt. Canned tomatoes. Canned tomato sauce. Fried vegetables. Vegetables in cream sauce or cheese sauce.  Fruits   Fruits packed in syrup or made with added sugar.  Meats and Other Protein Sources   Marbled or fatty meats such as ribs. Poultry with skin. Fried meats, poultry, eggs, or fish. Sausages, hot dogs, and deli meats such as pastrami, bologna, or salami.  Dairy   Whole milk, cream, cheeses made from whole milk, sour cream. Ice cream or yogurt made from whole milk or with added sugar.  Beverages   For adults, no more than one alcoholic drink per day. Regular soft drinks or other sugary beverages. Juice drinks.  Sweets and Desserts   Sugary or fatty desserts, candy, and other sweets.  Fats and Oils   Solid shortening or partially hydrogenated oils. Solid margarine. Margarine that contains trans fats. Butter.  The items listed above may not be a complete list of foods and beverages to avoid. Contact your dietitian for more information.   This information is not intended to replace advice given to you by your health care provider. Make sure you discuss any questions you have with your health care provider.  Document Released: 01/06/2009 Document Revised: 05/25/2017 Document Reviewed: 11/26/2014  [employer] Interactive Patient Education © 2017 [employer]  BMI for Adults  Body mass index  (BMI) is a number that is calculated from a person's weight and height. In most adults, the number is used to find how much of an adult's weight is made up of fat. BMI is not as accurate as a direct measure of body fat.  How is BMI calculated?  BMI is calculated by dividing weight in kilograms by height in meters squared. It can also be calculated by dividing weight in pounds by height in inches squared, then multiplying the resulting number by 703. Charts are available to help you find your BMI quickly and easily without doing this calculation.  How is BMI interpreted?  Health care professionals use BMI charts to identify whether an adult is underweight, at a normal weight, or overweight based on the following guidelines:  · Underweight: BMI less than 18.5.  · Normal weight: BMI between 18.5 and 24.9.  · Overweight: BMI between 25 and 29.9.  · Obese: BMI of 30 and above.  BMI is usually interpreted the same for males and females.  Weight includes both fat and muscle, so someone with a muscular build, such as an athlete, may have a BMI that is higher than 24.9. In cases like these, BMI may not accurately depict body fat. To determine if excess body fat is the cause of a BMI of 25 or higher, further assessments may need to be done by a health care provider.  Why is BMI a useful tool?  BMI is used to identify a possible weight problem that may be related to a medical problem or may increase the risk for medical problems. BMI can also be used to promote changes to reach a healthy weight.  This information is not intended to replace advice given to you by your health care provider. Make sure you discuss any questions you have with your health care provider.  Document Released: 08/29/2005 Document Revised: 04/27/2017 Document Reviewed: 05/15/2015  [employer] Interactive Patient Education © 2017 [employer]

## 2018-07-25 RX ORDER — GLYBURIDE 2.5 MG/1
TABLET ORAL
Qty: 60 TABLET | Refills: 1 | Status: SHIPPED | OUTPATIENT
Start: 2018-07-25 | End: 2020-12-17

## 2021-09-21 ENCOUNTER — TELEMEDICINE (OUTPATIENT)
Dept: FAMILY MEDICINE CLINIC | Facility: CLINIC | Age: 41
End: 2021-09-21

## 2021-09-21 VITALS — HEIGHT: 73 IN | WEIGHT: 180 LBS | BODY MASS INDEX: 23.86 KG/M2

## 2021-09-21 DIAGNOSIS — Z13.29 SCREENING FOR THYROID DISORDER: Primary | ICD-10-CM

## 2021-09-21 DIAGNOSIS — E10.65 UNCONTROLLED TYPE 1 DIABETES MELLITUS WITH HYPERGLYCEMIA (HCC): ICD-10-CM

## 2021-09-21 DIAGNOSIS — I10 ESSENTIAL HYPERTENSION: ICD-10-CM

## 2021-09-21 DIAGNOSIS — Z13.220 SCREENING FOR HYPERLIPIDEMIA: ICD-10-CM

## 2021-09-21 PROBLEM — B19.20 HEPATITIS C VIRUS INFECTION WITHOUT HEPATIC COMA: Status: ACTIVE | Noted: 2018-02-05

## 2021-09-21 PROBLEM — E55.9 VITAMIN D DEFICIENCY: Status: ACTIVE | Noted: 2018-01-31

## 2021-09-21 PROBLEM — G47.33 OBSTRUCTIVE SLEEP APNEA SYNDROME: Status: ACTIVE | Noted: 2021-09-21

## 2021-09-21 PROBLEM — K21.9 GASTROESOPHAGEAL REFLUX DISEASE: Status: ACTIVE | Noted: 2021-09-21

## 2021-09-21 PROCEDURE — 99204 OFFICE O/P NEW MOD 45 MIN: CPT | Performed by: NURSE PRACTITIONER

## 2021-09-21 RX ORDER — INSULIN GLARGINE 100 [IU]/ML
25 INJECTION, SOLUTION SUBCUTANEOUS DAILY
Qty: 2 PEN | Refills: 3 | Status: SHIPPED | OUTPATIENT
Start: 2021-09-21

## 2021-09-21 RX ORDER — INSULIN LISPRO 100 [IU]/ML
INJECTION, SOLUTION INTRAVENOUS; SUBCUTANEOUS
Qty: 5 PEN | Refills: 3 | Status: SHIPPED | OUTPATIENT
Start: 2021-09-21 | End: 2021-09-23 | Stop reason: SDUPTHER

## 2021-09-21 RX ORDER — PEN NEEDLE, DIABETIC 30 GX3/16"
4 NEEDLE, DISPOSABLE MISCELLANEOUS DAILY
Qty: 30 EACH | Refills: 5
Start: 2021-09-21

## 2021-09-21 NOTE — PROGRESS NOTES
"Subjective   Pancho Hobson is a 40 y.o. male.   You have chosen to receive care through a telehealth visit.  Do you consent to use a video/audio connection for your medical care today? Yes  This was an audio and video enabled telemedicine encounter.        Chief Complaint   Patient presents with   • Establish Care   • Diabetes        History of Present Illness     Patient here to establish care and address T2DM.     Patient diagnosed previously with T2DM uncontrolled, reports that Dr Ley diagnosed him with T1 DM instead. Has not been to doctor in quite a while and has been borrowing extra Lantus and Humalog insulins from friends til today.  Needs refill Lantus, uses 25 units daily and Humalog Quikpen, uses high dose sliding scale TID AC for prandial coverage. Reports A1C has been \"kirk high\".     Most recent a1c in Epic is from 3/13/2020 and was 8.7%      No other complaints today.   Parts of most recent relevant visit HPI, ROS  and PE may be carried forward and all are updated as appropriate for current situation.    Past Surgical History:   Procedure Laterality Date   • HIP FRACTURE SURGERY      Treat hip fracture-right hip with pins   • TONSILLECTOMY     • TRACHEOSTOMY  10/2009    Oct 2009   • WRIST FRACTURE SURGERY      Treat wrist fracture-right wrist with screws      Social History     Socioeconomic History   • Marital status:      Spouse name: Not on file   • Number of children: Not on file   • Years of education: Not on file   • Highest education level: Not on file   Tobacco Use   • Smoking status: Current Every Day Smoker     Packs/day: 1.50     Types: Cigarettes   • Smokeless tobacco: Never Used   • Tobacco comment: 10-19 cigs/day   Substance and Sexual Activity   • Alcohol use: Yes   • Drug use: Yes     Frequency: 7.0 times per week     Types: Marijuana      The following portions of the patient's history were reviewed and updated as appropriate: He  has a past medical history of Closed " fracture dislocation of wrist, Closed fracture of foot, Closed fracture of right hip (CMS/HCC), Compression fracture of spine (CMS/HCC), Diabetes mellitus (CMS/HCC), Hypertension, and Infectious viral hepatitis..    Review of Systems   Constitutional: Negative.  Negative for activity change, appetite change, chills, fever and unexpected weight loss.   HENT: Negative.  Negative for ear pain, postnasal drip, rhinorrhea, sinus pressure, sneezing, sore throat, swollen glands, trouble swallowing and voice change.    Eyes: Negative.  Negative for discharge, redness, itching and visual disturbance.   Respiratory: Negative.  Negative for cough, shortness of breath, wheezing and stridor.    Cardiovascular: Negative.  Negative for chest pain, palpitations and leg swelling.   Gastrointestinal: Negative.    Endocrine: Negative.  Negative for polydipsia, polyphagia and polyuria.   Genitourinary: Negative.  Negative for dysuria.   Musculoskeletal: Negative.  Negative for arthralgias and back pain.   Skin: Negative.    Allergic/Immunologic: Negative.    Neurological: Negative.    Hematological: Negative.    Psychiatric/Behavioral: Negative.  Negative for behavioral problems, dysphoric mood, sleep disturbance, suicidal ideas and depressed mood. The patient is not nervous/anxious.    All other systems reviewed and are negative.    PHQ-9 Depression Screening  Little interest or pleasure in doing things? 0   Feeling down, depressed, or hopeless? 0   Trouble falling or staying asleep, or sleeping too much?     Feeling tired or having little energy?     Poor appetite or overeating?     Feeling bad about yourself - or that you are a failure or have let yourself or your family down?     Trouble concentrating on things, such as reading the newspaper or watching television?     Moving or speaking so slowly that other people could have noticed? Or the opposite - being so fidgety or restless that you have been moving around a lot more than  usual?     Thoughts that you would be better off dead, or of hurting yourself in some way?     PHQ-9 Total Score 0   If you checked off any problems, how difficult have these problems made it for you to do your work, take care of things at home, or get along with other people?        Objective   Physical Exam  Vitals and nursing note reviewed.   Constitutional:       General: He is not in acute distress.     Appearance: He is well-developed. He is not diaphoretic.   HENT:      Head: Normocephalic and atraumatic.   Eyes:      General: No scleral icterus.        Right eye: No discharge.         Left eye: No discharge.      Conjunctiva/sclera: Conjunctivae normal.      Pupils: Pupils are equal, round, and reactive to light.   Neck:      Trachea: No tracheal deviation.   Pulmonary:      Effort: Pulmonary effort is normal. No respiratory distress.      Breath sounds: No wheezing.   Musculoskeletal:         General: No tenderness or deformity. Normal range of motion.      Cervical back: Normal range of motion and neck supple.   Skin:     General: Skin is dry.      Coloration: Skin is not pale.      Findings: No erythema or rash.   Neurological:      Mental Status: He is alert and oriented to person, place, and time.      Cranial Nerves: No cranial nerve deficit.   Psychiatric:         Behavior: Behavior normal.         Thought Content: Thought content normal.           Assessment/Plan   Diagnoses and all orders for this visit:    1. Screening for thyroid disorder (Primary)  -     TSH; Future  -     T3, free; Future    2. Screening for hyperlipidemia  -     Lipid panel; Future    3. Uncontrolled type 1 diabetes mellitus with hyperglycemia (CMS/HCC)  -     Hemoglobin A1c; Future  -     Lantus SoloStar 100 UNIT/ML injection pen; Inject 25 Units under the skin into the appropriate area as directed Daily.  Dispense: 2 pen; Refill: 3  -     Insulin Lispro, 1 Unit Dial, (HumaLOG KwikPen) 100 UNIT/ML solution pen-injector;  Sliding scale 0 for </=150; 151-200 inject 6 units; 201-250 8 units; 251-300  10 units; 301-350 12 units; 351-400 14 units; 401 and above inject 16 units  Dispense: 5 pen; Refill: 3  -     Insulin Pen Needle (Pen Needles) 32G X 4 MM misc; 4 each Daily. For lantus injection and 3 daily for humalog injections  Dispense: 30 each; Refill: 5    4. Essential hypertension  -     CBC & Differential; Future  -     Comprehensive metabolic panel; Future        Return in about 4 weeks (around 10/19/2021), or if symptoms worsen or fail to improve.           This document has been electronically signed by YURI Black on September 21, 2021 18:04 CDT

## 2021-09-23 RX ORDER — INSULIN LISPRO 100 [IU]/ML
INJECTION, SOLUTION INTRAVENOUS; SUBCUTANEOUS
Qty: 5 PEN | Refills: 3 | Status: SHIPPED | OUTPATIENT
Start: 2021-09-23 | End: 2022-02-08 | Stop reason: SDUPTHER

## 2022-02-08 DIAGNOSIS — E10.65 UNCONTROLLED TYPE 1 DIABETES MELLITUS WITH HYPERGLYCEMIA: ICD-10-CM

## 2022-02-08 RX ORDER — INSULIN LISPRO 100 [IU]/ML
INJECTION, SOLUTION INTRAVENOUS; SUBCUTANEOUS
Qty: 5 PEN | Refills: 3 | Status: SHIPPED | OUTPATIENT
Start: 2022-02-08